# Patient Record
Sex: MALE | Race: BLACK OR AFRICAN AMERICAN | NOT HISPANIC OR LATINO | Employment: OTHER | URBAN - METROPOLITAN AREA
[De-identification: names, ages, dates, MRNs, and addresses within clinical notes are randomized per-mention and may not be internally consistent; named-entity substitution may affect disease eponyms.]

---

## 2017-10-03 ENCOUNTER — TRANSFERRED RECORDS (OUTPATIENT)
Dept: HEALTH INFORMATION MANAGEMENT | Facility: CLINIC | Age: 82
End: 2017-10-03

## 2017-10-04 ENCOUNTER — HOSPITAL ENCOUNTER (INPATIENT)
Facility: CLINIC | Age: 82
LOS: 3 days | Discharge: HOME | DRG: 291 | End: 2017-10-07
Attending: INTERNAL MEDICINE | Admitting: FAMILY MEDICINE

## 2017-10-04 DIAGNOSIS — I10 BENIGN ESSENTIAL HYPERTENSION: Primary | ICD-10-CM

## 2017-10-04 DIAGNOSIS — I50.30 (HFPEF) HEART FAILURE WITH PRESERVED EJECTION FRACTION (H): ICD-10-CM

## 2017-10-04 PROBLEM — N28.9 KIDNEY DISEASE: Status: ACTIVE | Noted: 2017-10-04

## 2017-10-04 PROBLEM — E78.5 HYPERLIPIDEMIA LDL GOAL <100: Status: ACTIVE | Noted: 2017-10-04

## 2017-10-04 PROBLEM — E78.5 HYPERLIPIDEMIA LDL GOAL <100: Status: RESOLVED | Noted: 2017-10-04 | Resolved: 2017-10-04

## 2017-10-04 PROBLEM — I50.9 ACUTE EXACERBATION OF CHF (CONGESTIVE HEART FAILURE) (H): Status: ACTIVE | Noted: 2017-10-04

## 2017-10-04 PROBLEM — E11.8 TYPE 2 DIABETES MELLITUS WITH COMPLICATION (H): Status: ACTIVE | Noted: 2017-10-04

## 2017-10-04 LAB
ALBUMIN SERPL-MCNC: 3.2 G/DL (ref 3.4–5)
ALP SERPL-CCNC: 87 U/L (ref 40–150)
ALT SERPL W P-5'-P-CCNC: 25 U/L (ref 0–70)
ANION GAP SERPL CALCULATED.3IONS-SCNC: 7 MMOL/L (ref 3–14)
AST SERPL W P-5'-P-CCNC: 8 U/L (ref 0–45)
BASOPHILS # BLD AUTO: 0 10E9/L (ref 0–0.2)
BASOPHILS NFR BLD AUTO: 0.5 %
BILIRUB SERPL-MCNC: 0.5 MG/DL (ref 0.2–1.3)
BUN SERPL-MCNC: 14 MG/DL (ref 7–30)
CALCIUM SERPL-MCNC: 10 MG/DL (ref 8.5–10.1)
CHLORIDE SERPL-SCNC: 108 MMOL/L (ref 94–109)
CO2 SERPL-SCNC: 26 MMOL/L (ref 20–32)
CREAT SERPL-MCNC: 1.41 MG/DL (ref 0.66–1.25)
DIFFERENTIAL METHOD BLD: NORMAL
EOSINOPHIL # BLD AUTO: 0.2 10E9/L (ref 0–0.7)
EOSINOPHIL NFR BLD AUTO: 2.8 %
ERYTHROCYTE [DISTWIDTH] IN BLOOD BY AUTOMATED COUNT: 14.1 % (ref 10–15)
GFR SERPL CREATININE-BSD FRML MDRD: 48 ML/MIN/1.7M2
GLUCOSE BLDC GLUCOMTR-MCNC: 122 MG/DL (ref 70–99)
GLUCOSE SERPL-MCNC: 131 MG/DL (ref 70–99)
HCT VFR BLD AUTO: 40 % (ref 40–53)
HGB BLD-MCNC: 13.4 G/DL (ref 13.3–17.7)
IMM GRANULOCYTES # BLD: 0 10E9/L (ref 0–0.4)
IMM GRANULOCYTES NFR BLD: 0.2 %
LYMPHOCYTES # BLD AUTO: 1 10E9/L (ref 0.8–5.3)
LYMPHOCYTES NFR BLD AUTO: 18.4 %
MCH RBC QN AUTO: 28.5 PG (ref 26.5–33)
MCHC RBC AUTO-ENTMCNC: 33.5 G/DL (ref 31.5–36.5)
MCV RBC AUTO: 85 FL (ref 78–100)
MONOCYTES # BLD AUTO: 0.3 10E9/L (ref 0–1.3)
MONOCYTES NFR BLD AUTO: 5.5 %
NEUTROPHILS # BLD AUTO: 4.1 10E9/L (ref 1.6–8.3)
NEUTROPHILS NFR BLD AUTO: 72.6 %
NRBC # BLD AUTO: 0 10*3/UL
NRBC BLD AUTO-RTO: 0 /100
NT-PROBNP SERPL-MCNC: 1766 PG/ML (ref 0–1800)
PLATELET # BLD AUTO: 199 10E9/L (ref 150–450)
POTASSIUM SERPL-SCNC: 3.6 MMOL/L (ref 3.4–5.3)
PROT SERPL-MCNC: 6.8 G/DL (ref 6.8–8.8)
RBC # BLD AUTO: 4.7 10E12/L (ref 4.4–5.9)
SODIUM SERPL-SCNC: 141 MMOL/L (ref 133–144)
TROPONIN I SERPL-MCNC: 0.01 UG/L (ref 0–0.04)
TROPONIN I SERPL-MCNC: <0.015 UG/L (ref 0–0.04)
WBC # BLD AUTO: 5.7 10E9/L (ref 4–11)

## 2017-10-04 PROCEDURE — 40000257 ZZH STATISTIC CONSULT NO CHARGE VASC ACCESS

## 2017-10-04 PROCEDURE — 85025 COMPLETE CBC W/AUTO DIFF WBC: CPT | Performed by: FAMILY MEDICINE

## 2017-10-04 PROCEDURE — 36415 COLL VENOUS BLD VENIPUNCTURE: CPT | Performed by: FAMILY MEDICINE

## 2017-10-04 PROCEDURE — 40000141 ZZH STATISTIC PERIPHERAL IV START W/O US GUIDANCE

## 2017-10-04 PROCEDURE — 84484 ASSAY OF TROPONIN QUANT: CPT | Performed by: FAMILY MEDICINE

## 2017-10-04 PROCEDURE — 80053 COMPREHEN METABOLIC PANEL: CPT | Performed by: FAMILY MEDICINE

## 2017-10-04 PROCEDURE — 83880 ASSAY OF NATRIURETIC PEPTIDE: CPT | Performed by: FAMILY MEDICINE

## 2017-10-04 PROCEDURE — 00000146 ZZHCL STATISTIC GLUCOSE BY METER IP

## 2017-10-04 PROCEDURE — 25000128 H RX IP 250 OP 636: Performed by: FAMILY MEDICINE

## 2017-10-04 PROCEDURE — 12000001 ZZH R&B MED SURG/OB UMMC

## 2017-10-04 RX ORDER — HYDRALAZINE HYDROCHLORIDE 20 MG/ML
5-10 INJECTION INTRAMUSCULAR; INTRAVENOUS EVERY 6 HOURS PRN
Status: DISCONTINUED | OUTPATIENT
Start: 2017-10-04 | End: 2017-10-05

## 2017-10-04 RX ORDER — ACETAMINOPHEN 325 MG/1
650 TABLET ORAL EVERY 4 HOURS PRN
Status: DISCONTINUED | OUTPATIENT
Start: 2017-10-04 | End: 2017-10-07 | Stop reason: HOSPADM

## 2017-10-04 RX ORDER — NICOTINE POLACRILEX 4 MG
15-30 LOZENGE BUCCAL
Status: DISCONTINUED | OUTPATIENT
Start: 2017-10-04 | End: 2017-10-07 | Stop reason: HOSPADM

## 2017-10-04 RX ORDER — ONDANSETRON 2 MG/ML
4 INJECTION INTRAMUSCULAR; INTRAVENOUS EVERY 6 HOURS PRN
Status: DISCONTINUED | OUTPATIENT
Start: 2017-10-04 | End: 2017-10-07 | Stop reason: HOSPADM

## 2017-10-04 RX ORDER — NALOXONE HYDROCHLORIDE 0.4 MG/ML
.1-.4 INJECTION, SOLUTION INTRAMUSCULAR; INTRAVENOUS; SUBCUTANEOUS
Status: DISCONTINUED | OUTPATIENT
Start: 2017-10-04 | End: 2017-10-07 | Stop reason: HOSPADM

## 2017-10-04 RX ORDER — FUROSEMIDE 10 MG/ML
20 INJECTION INTRAMUSCULAR; INTRAVENOUS ONCE
Status: COMPLETED | OUTPATIENT
Start: 2017-10-04 | End: 2017-10-04

## 2017-10-04 RX ORDER — ONDANSETRON 4 MG/1
4 TABLET, ORALLY DISINTEGRATING ORAL EVERY 6 HOURS PRN
Status: DISCONTINUED | OUTPATIENT
Start: 2017-10-04 | End: 2017-10-07 | Stop reason: HOSPADM

## 2017-10-04 RX ORDER — AMOXICILLIN 250 MG
1-2 CAPSULE ORAL 2 TIMES DAILY
Status: DISCONTINUED | OUTPATIENT
Start: 2017-10-04 | End: 2017-10-07 | Stop reason: HOSPADM

## 2017-10-04 RX ORDER — DEXTROSE MONOHYDRATE 25 G/50ML
25-50 INJECTION, SOLUTION INTRAVENOUS
Status: DISCONTINUED | OUTPATIENT
Start: 2017-10-04 | End: 2017-10-07 | Stop reason: HOSPADM

## 2017-10-04 RX ADMIN — HYDRALAZINE HYDROCHLORIDE 10 MG: 20 INJECTION INTRAMUSCULAR; INTRAVENOUS at 21:29

## 2017-10-04 RX ADMIN — FUROSEMIDE 20 MG: 10 INJECTION, SOLUTION INTRAVENOUS at 21:00

## 2017-10-04 ASSESSMENT — ENCOUNTER SYMPTOMS
PALPITATIONS: 1
AGITATION: 0
CONSTIPATION: 0
DIAPHORESIS: 0
DIZZINESS: 0
EYE PAIN: 0
ABDOMINAL PAIN: 0
UNEXPECTED WEIGHT CHANGE: 1
WHEEZING: 0
FEVER: 0
DYSURIA: 0
NAUSEA: 0
COLOR CHANGE: 0
NUMBNESS: 0
BACK PAIN: 0
SHORTNESS OF BREATH: 1
RHINORRHEA: 0
WEAKNESS: 0
CHILLS: 0
CHEST TIGHTNESS: 1
FREQUENCY: 0
COUGH: 0
DIARRHEA: 0
FATIGUE: 0
DIFFICULTY URINATING: 0
VOMITING: 0
CONFUSION: 0
LIGHT-HEADEDNESS: 0
WOUND: 0
APPETITE CHANGE: 0
HEADACHES: 0
SORE THROAT: 0

## 2017-10-04 NOTE — H&P
Attestation:  This patient has been seen and evaluated by me, Cale Craig on 10/4/2017.  I saw and discussed the case with the primary resident and the care team. I agree with the findings and plan in this note. I have reviewed today's vital signs, medications, laboratory results and imaging results.    Cale Craig MD  [facility]                                                                                                          [facility]  Inpatient History and Physical    Jami Morales MRN# [id number]   Age: 82 year old YOB: 1935     10/4/2017 5:26 PM    Home clinic: [facility]  Primary care provider:  Frankie Delaney MD          Chief Concern:   Shortness of breath and high blood pressure       History is obtained from the patient and his three sons at the bedside.          History of Present Illness (Resident / Clinician):   Jami Morales is a 82 year old Burmese male with a significant past medical history of hypertension and diabetes mellitus who presents with new onset dyspnea for 4-5 days and elevated BP. Patient has been on vacation in [place] from [facility] and has been having elevated BP for the past week as well as dyspnea and orthopnea for the past 4-5 days. He feels short of breath with exertion and when laying in bed at night which is partially relieved with pillows. He has also had bilateral lower extremity swelling for the past 4 months. In addition he has been feeling palpitations for the past 3 days but denies chest pain, syncope dizziness, or lightheadedness.    Last Wednesday patient presented to Dr. Delaney for abdominal pain and constipation and was found to have high blood pressure with no complaint of dyspnea. He was continued on lasartan 100 mg daily and added metoprolol 50 mg daily. Patient followed up with Dr. Delaney yesterday 10/3 and continued to have elevated /90 as well as episodes of hypoglycemia so his  glimeperide 4mg daily and linagliptin/metformin 2.5mg/500mg were discontinued.  Dr. Delaney discontinued metoprolol and started hydralazine 25 mg TID and Lasix 40 mg daily for his BP. In addition he checked BNP which was elevated at 443 and had Cr 1.5 so at that time called and advised patient to be admitted to the hospital for further work-up.     Of note patient is from \[facility]\"" and has been seen in [facility], [place], and [place] over the past 4 months with the main concern of lower extremity edema. He has been on various blood pressure and diabetes medications in the past few months but on admission was only taking losartan 100mg daily, hydralazine 25 mg TID, and lasix 40 mg daily.       Old records were obtained reviewed, and any additions to pertinent history are noted above.           Past Medical History:     Past Medical History:   Diagnosis Date     HTN (hypertension)      Type 2 diabetes mellitus (H)              Past Surgical History:     Past Surgical History:   Procedure Laterality Date     PROSTATE SURGERY  2008                Social History:   I have reviewed this patient's social history   no tobacco use, quit 30 years ago, prior 15-20 years 1PPD  no alcohol use  no illicit drug use           Family History:   I have reviewed this patient's family history   Mother- Hypertension, pacemaker  Son - Hypertension            Immunizations:   Immunization status is unknown          Allergies:   Review of patient's allergies indicates no known allergies.          Medications:     Losartan 100 mg daily   Hydralazine 25 mg three times a day   Lasix 40 mg daily   Metformin/Januvia 500/2.5 daily   Amaryl 4 mg two times a day          Review of Systems:   Review of Systems   Constitutional: Positive for unexpected weight change. Negative for appetite change, chills, diaphoresis, fatigue and fever.        Weight gain of 5 lbs in last week.    HENT: Negative for congestion, rhinorrhea and sore throat.    Eyes: Negative  for pain and visual disturbance.   Respiratory: Positive for chest tightness and shortness of breath. Negative for cough and wheezing.    Cardiovascular: Positive for palpitations and leg swelling. Negative for chest pain.   Gastrointestinal: Negative for abdominal pain, constipation, diarrhea, nausea and vomiting.   Genitourinary: Negative for difficulty urinating, dysuria, frequency and urgency.   Musculoskeletal: Negative for back pain and gait problem.   Skin: Negative for color change, pallor, rash and wound.   Neurological: Negative for dizziness, syncope, weakness, light-headedness, numbness and headaches.   Psychiatric/Behavioral: Negative for agitation and confusion.               Physical Exam:   Vitals were reviewed  Vital signs:  Temp: 97.9  F (36.6  C) Temp src: Oral BP: (!) 213/106 Pulse: 86   Resp: 16 SpO2: 96 % O2 Device: None (Room air)        There is no height or weight on file to calculate BMI.    Physical Exam   Constitutional: He is oriented to person, place, and time. He appears well-developed and well-nourished. No distress.   HENT:   Mouth/Throat: Oropharynx is clear and moist.   Eyes: Pupils are equal, round, and reactive to light.   Neck: No JVD present.   Cardiovascular: Normal rate and regular rhythm.  Exam reveals gallop.    No murmur heard.  Displaced PMI.    Pulmonary/Chest: Effort normal. No stridor. No respiratory distress. He has no wheezes.   Crackles bilateral lung bases.    Abdominal: Soft. Bowel sounds are normal. He exhibits no distension. There is no tenderness. There is no guarding.   Musculoskeletal: He exhibits edema.   2+ edema left lower extremity. 1+ edema right lower extremity.    Lymphadenopathy:     He has no cervical adenopathy.   Neurological: He is alert and oriented to person, place, and time.   Skin: Skin is warm and dry. He is not diaphoretic. No erythema.            Data:     Results for orders placed or performed during the hospital encounter of 10/04/17  (from the past 24 hour(s))   Glucose by meter   Result Value Ref Range    Glucose 122 (H) 70 - 99 mg/dL   Comprehensive metabolic panel   Result Value Ref Range    Sodium 141 133 - 144 mmol/L    Potassium 3.6 3.4 - 5.3 mmol/L    Chloride 108 94 - 109 mmol/L    Carbon Dioxide 26 20 - 32 mmol/L    Anion Gap 7 3 - 14 mmol/L    Glucose 131 (H) 70 - 99 mg/dL    Urea Nitrogen 14 7 - 30 mg/dL    Creatinine 1.41 (H) 0.66 - 1.25 mg/dL    GFR Estimate 48 (L) >60 mL/min/1.7m2    GFR Estimate If Black 59 (L) >60 mL/min/1.7m2    Calcium 10.0 8.5 - 10.1 mg/dL    Bilirubin Total 0.5 0.2 - 1.3 mg/dL    Albumin 3.2 (L) 3.4 - 5.0 g/dL    Protein Total 6.8 6.8 - 8.8 g/dL    Alkaline Phosphatase 87 40 - 150 U/L    ALT 25 0 - 70 U/L    AST 8 0 - 45 U/L   CBC with platelets differential   Result Value Ref Range    WBC 5.7 4.0 - 11.0 10e9/L    RBC Count 4.70 4.4 - 5.9 10e12/L    Hemoglobin 13.4 13.3 - 17.7 g/dL    Hematocrit 40.0 40.0 - 53.0 %    MCV 85 78 - 100 fl    MCH 28.5 26.5 - 33.0 pg    MCHC 33.5 31.5 - 36.5 g/dL    RDW 14.1 10.0 - 15.0 %    Platelet Count 199 150 - 450 10e9/L    Diff Method Automated Method     % Neutrophils 72.6 %    % Lymphocytes 18.4 %    % Monocytes 5.5 %    % Eosinophils 2.8 %    % Basophils 0.5 %    % Immature Granulocytes 0.2 %    Nucleated RBCs 0 0 /100    Absolute Neutrophil 4.1 1.6 - 8.3 10e9/L    Absolute Lymphocytes 1.0 0.8 - 5.3 10e9/L    Absolute Monocytes 0.3 0.0 - 1.3 10e9/L    Absolute Eosinophils 0.2 0.0 - 0.7 10e9/L    Absolute Basophils 0.0 0.0 - 0.2 10e9/L    Abs Immature Granulocytes 0.0 0 - 0.4 10e9/L    Absolute Nucleated RBC 0.0    Troponin I   Result Value Ref Range    Troponin I ES 0.015 0.000 - 0.045 ug/L      EKG results: outside clinic   Performed yesterday  I have reviewed this patient's EKG with the following interpretation:    NSR with occasional PVC with 1st degree AV block with nonspecific T wave abnormalities         All imaging studies reviewed by me.  Chest x-ray: outside  clinic   ?bilateral basilar infiltrates, effusions, pneumothoraces, cardiomegaly or masses             Assessment and Plan:   Assessment:   Jami Morales is a 82 year old [place] male with a significant past medical history of hypertension and diabetes mellitus who presents with new onset dyspnea for 4-5 days and elevated BP.  Patient Active Problem List   Diagnosis     Acute exacerbation of CHF (congestive heart failure) (H)     Benign essential hypertension     Type 2 diabetes mellitus with complication (H)     Kidney disease - Unclear CKD vs FARHAN         Plan:    ##Acute onset shortness of breath likely due to acute CHF exacerbation   Patient has been experiencing new onset dyspnea and orthopnea for the past 4-5 days and leg swelling for the past 3-4 months. BNP on 10/3 was elevated to 443 with Creatinine of 1.5. Chest xray yesterday showed bilateral pleural effusions with borderline cardiomegaly. On cardiac exam he has a S3 gallop and displaced PMI and bilateral pitting edema.Patients constellation of symptoms is likely due to acute CHF exacerbation in setting of chronic hypertension   - CBC, CMP, BNP, lipid panel ordered   - Continuous  Telemetry monitoring   - Serial troponins and EKG   - Echo ordered   - Lasix IV 20 mg, will evaluate diuerses in am    - Cardiology consult  - Nutrition consult for low sodium diet  - Low sodium diet <2gm  - Strict I/Os  - Daily weights   - Fluid restriction of 1500ml    ## Hypertension urgency vs emergency   /106 on admission . Most recent creatinine was 1.5 up from 1.37 from a week ago. Unclear if this is acute vs chronic kidney injury as no prior records but patient and his sons report his baseline creatinine has been 1.3-1.4.  Will evaluate repeat labs for evidence of other end organ damage and possible hypertensive emergency.   - Hold PTA losaratan 100 mg daily in setting of likely acute CHF   - Hold PTA hydralazine 25 mg TID  - Hydralazine IV 5-10mg q6H PRN for  SBP >180 or DBP >110.    ##Diabetes Mellitus type II  DM II for 15-20 years, patient reports hgbA1c of less than 5 but will evaluate further.   - HgbA1c ordered  - Low dose sliding scale insulin   - Continue to hold PTA glimeperide 4mg daily and linagliptin/metformin 2.5mg/500mg    ## Acute vs Chronic kidney Injury   Unclear of Cr 1.5 is acutely or chroncically elevated. Patient reports chronic elevation of 1.4 but will evaluate further.   -UA and mircoalbumin ordered     Daily cares -   F: 1500ml fluid restriction   E:labs pending   N: 2gm sodium  Lines:PIV  Activity:As tolerated   CODE:Full code  Prophylaxis: ambulation   PCP communication:  - No Ref-Primary, Physician  Dispo: Expected discharge date likely 2-3 days pending clinical improvement.      Discussed with and examined by faculty.  Laly Montenegro, MS4      The medical student acted as scribe and the encounter documented above was completely performed by myself and the documentation reflects the work I have performed today. MD Kristina Alvarez MD MPH  PGY3- [facility], Family Medicine  Pager- [phone]

## 2017-10-04 NOTE — PROGRESS NOTES
[facility]  Transfer Triage Note    Date of call: 10/4/2017  Time of call: afternoon    Reason for Transfer: Admit from PCP clinic  Diagnosis: Heart failure    Outside Records: in careeverywhere    Stability of Patient: Patient coming in from home and deemed stable by PCP    Expected Time of Arrival for Transfer: < 24 hours    Recommendations for Management and Stabilization: None    Additional Comments:     83 y/o Greek male with h/o HTN, DM and new diagnosis of heart failure.  He was seen in PCP clinic one week ago.  Found to have elevated blood pressure of 180/95.  Started on metoprolol and lisinopril.  He was previously on nifedipine which was changed to losartan in [place].  Returned to clinic and still hypertensive to 190/95.  Cr is up to 1.5.  He was then started on hydralazine and metoprolol stopped.  Found to also have lower extremity edema, shortness of breath, cxr with pulmonary edema.  EKG with left anterior fasicular block.  PCP requesting admission given failed outpatient hypertension management as well as volume overload requiring diuretics monitoring of creatinine.      Dolores Kenny DO

## 2017-10-04 NOTE — IP AVS SNAPSHOT
[address] [address]    Phone:  [phone]                                       After Visit Summary   10/4/2017    Jami Morales    MRN: [id number]           After Visit Summary Signature Page     I have received my discharge instructions, and my questions have been answered. I have discussed any challenges I see with this plan with the nurse or doctor.    ..........................................................................................................................................  Patient/Patient Representative Signature      ..........................................................................................................................................  Patient Representative Print Name and Relationship to Patient    ..................................................               ................................................  Date                                            Time    ..........................................................................................................................................  Reviewed by Signature/Title    ...................................................              ..............................................  Date                                                            Time

## 2017-10-04 NOTE — IP AVS SNAPSHOT
MRN:[id number]                      After Visit Summary   10/4/2017    Jami Morales    MRN: [id number]           Thank you!     Thank you for choosing [facility] for your care. Our goal is always to provide you with excellent care. Hearing back from our patients is one way we can continue to improve our services. Please take a few minutes to complete the written survey that you may receive in the mail after you visit with us. Thank you!        Patient Information     Date Of Birth          1935        Designated Caregiver       Most Recent Value    Caregiver    Will someone help with your care after discharge? yes    Name of designated caregiver luciano    Phone number of caregiver     Caregiver address [address] [place]      About your hospital stay     You were admitted on:  October 4, 2017 You last received care in the:  [address] [facility]    You were discharged on:  October 7, 2017        Reason for your hospital stay       You were admitted for treatment of heart failure and high blood pressure.                  Who to Call     For medical emergencies, please call 911.  For non-urgent questions about your medical care, please call your primary care provider or clinic, None          Attending Provider     Provider Specialty    Dolores Kenny,  Internal Medicine    Cale Craig MD Family Practice    [facility]       Primary Care Provider Fax #    Physician No Ref-Primary [phone]      After Care Instructions     Activity       Your activity upon discharge: activity as tolerated            Diet       Follow this diet upon discharge: Orders Placed This Encounter      Fluid restriction 1500 ML FLUID      2 Gram Sodium Diet                  Follow-up Appointments     Adult [facility] Follow-up and recommended labs and tests       Follow up with primary care provider,Dr. Ortiz, within 7 days for hospital follow- up.  The following  labs/tests are recommended: BMP.      Appointments on [place] and/or [facility] (with [facility] or [facility] provider or service). Call [phone] if you haven't heard regarding these appointments within 7 days of discharge.                  Further instructions from your care team       Please call for primary care provider follow up when you return to [place]    Please follow up with a doctor within the next 3 days.     Please get a repeat BMP (kidney function test with blood salt levels) at your next doctor appointment.     General Recommendations To Control Heart Failure When You Get Home       Heart Failure Instructions for Patients and Families: Please read and check off each of these important instructions as you do them when you get home.          Weight and Symptoms       ___ Put a scale in your bathroom.    ___ Post a weight chart or calendar next to your scale.    ___ Weigh yourself everyday as soon as you get up in the morning (before breakfast).  You should only be wearing your pajamas.  Write your weight on the chart/calendar.  ___ Bring your weight chart/calendar with you to all appointments.  ___ Call your doctor or nurse practitioner if you gain 2 pounds (in 1 day) or 5 pounds in (1 week) from your goal  good  weight.  Your good weight is also called your  dry  weight.  Your doctor or nurse will tell you what your good weight should be.    ___ Call your doctor or nurse practitioner if you have shortness of breath that gets worse over time, leg swelling or fatigue.       Medications and Diet       ___ Make sure to take your medication as prescribed.    ___ Bring a current list of your medication and all of your medicine bottles with you to all appointments.    ___ Limit fluids if you still have swelling or shortness of breath, or if your doctor tells you to do so.    ___ Eat less than 2000 mg of sodium (salt) every day. Read food labels, and do not add salt to meals. Remember, if you eat less salt  you retain less fluid.  ___ Follow a heart healthy diet that is low in saturated fat.        Activity and Suggested Lifestyle Changes      ___ Stay active. Talk to your doctor about an exercise program that is safe for your heart.  ___ Stop smoking. Reduce alcohol use.      ___ Lose weight if you are overweight. Extra weight puts a lot of stress on the heart.                 Control for Leg Swelling     ___ Keep your legs elevated (raised) as needed for swelling. If swelling is uncomfortable or elevation doesn t help, ask your doctor about using ACE wraps or support stockings.        What is the [facility].R[facility] Clinic?  Cardiomyopathy  Optimization  Rehabilitation  Education    The [facility].R[facility] Clinic is a heart failure specialty clinic within [facility].  It is an outpatient disease management program that is based on a phase-by-phase approach, which is tailored to each patient s individual needs.  The cardiologist, nurse practitioner, physician assistant and nurses provide an ongoing outpatient care and treatment plan that guides heart failure and cardiomyopathy patients from evaluation and education to stabilization. This team works with your current primary care doctor and cardiologist to help you:    - Avoid hospitalizations  - Slow the progression of the disease  - Improve length and quality of life  - Know who and when to call if heart failure symptoms appear  - Receive easy access to quality health care and advice  - Better understand your condition and treatment  - Decrease the tremendous cost burden of heart failure care  - Detect future heart problems before they become life threatening                                 Follow-up Appointments     ___ An appointment with the [facility] Clinic may already have been made for you (see section   Your next appointments already scheduled ).  If you have a question about your appointment, would like to speak with a C.O.R.E. nurse, or would like to become a C.O.R.E.  "Clinic patient, please call one of the following locations:     - [facility] [place] [facility] ([place]):                                             [phone]  - [facility] ([place]):                                            [phone]  - [facility] ([place]):                 [phone]      ___ Your C.O.R.E. Clinic Team will continue to educate you on your heart failure and may adjust medications based on your vital signs, lab work, and how you are feeling.  Therefore, it is very important to bring the following to all C.O.R.E. appointments:    - An accurate list of your medications  - Your medicine bottles  - Your weight chart/calendar                   Pending Results     No orders found from 10/2/2017 to 10/5/2017.            Statement of Approval     Ordered          10/07/17 1347  I have reviewed and agree with all the recommendations and orders detailed in this document.  EFFECTIVE NOW     Approved and electronically signed by:  Kristina Cook MD             Admission Information     Date & Time Provider Department Dept. Phone    10/4/2017 Wilfred Kohli MD [address] [facility] [phone]      Your Vitals Were     Blood Pressure Pulse Temperature Respirations Height Weight    163/81 (BP Location: Right arm) 91 97.2  F (36.2  C) (Oral) 16 1.905 m (6' 3\") 92.5 kg (204 lb)    Pulse Oximetry BMI (Body Mass Index)                94% 25.5 kg/m2          MyChart Information     [facility] lets you send messages to your doctor, view your test results, renew your prescriptions, schedule appointments and more. To sign up, go to [email or web address] . Click on \"Log in\" on the left side of the screen, which will take you to the Welcome page. Then click on \"Sign up Now\" on the right side of the page.     You will be asked to enter the access code listed below, as well as some personal information. Please follow the directions to create your username and " password.     Your access code is: [email or web address]  Expires: 2018  1:47 PM     Your access code will  in 90 days. If you need help or a new code, please call your [facility] clinic or [phone].        Care EveryWhere ID     This is your Care EveryWhere ID. This could be used by other organizations to access your [facility] medical records  [id number]        Equal Access to Services     MAY CARRERA : Hadii aad ku hadasho Soomaali, waaxda luqadaha, qaybta kaalmada adeegyada, waxay idiin hayaan sergo rennerreginamayank caruso . So [facility] [phone].    ATENCIÓN: Si habla español, tiene a santos disposición servicios gratuitos de asistencia lingüística. Llame al [phone].    We comply with applicable federal civil rights laws and [place] laws. We do not discriminate on the basis of race, color, national origin, age, disability, sex, sexual orientation, or gender identity.               Review of your medicines      START taking        Dose / Directions    amLODIPine 10 MG tablet   Commonly known as:  NORVASC        Dose:  10 mg   Take 1 tablet (10 mg) by mouth daily   Quantity:  30 tablet   Refills:  0       furosemide 20 MG tablet   Commonly known as:  LASIX   Used for:  (HFpEF) heart failure with preserved ejection fraction (H)        Dose:  40 mg   Take 2 tablets (40 mg) by mouth daily   Quantity:  60 tablet   Refills:  0       losartan 100 MG tablet   Commonly known as:  COZAAR        Dose:  100 mg   Take 1 tablet (100 mg) by mouth daily   Quantity:  30 tablet   Refills:  0            Where to get your medicines      These medications were sent to [facility] [place], [place] - [address]  [address], [facility] [address]     Phone:  [phone]     amLODIPine 10 MG tablet    furosemide 20 MG tablet    losartan 100 MG tablet                Protect others around you: Learn how to safely use, store and throw away your medicines at www.disposemymeds.org.             Medication  List: This is a list of all your medications and when to take them. Check marks below indicate your daily home schedule. Keep this list as a reference.      Medications           Morning Afternoon Evening Bedtime As Needed    amLODIPine 10 MG tablet   Commonly known as:  NORVASC   Take 1 tablet (10 mg) by mouth daily   Last time this was given:  10 mg on 10/7/2017  9:19 AM                                furosemide 20 MG tablet   Commonly known as:  LASIX   Take 2 tablets (40 mg) by mouth daily                                losartan 100 MG tablet   Commonly known as:  COZAAR   Take 1 tablet (100 mg) by mouth daily   Last time this was given:  100 mg on 10/7/2017  9:19 AM

## 2017-10-05 ENCOUNTER — APPOINTMENT (OUTPATIENT)
Dept: CARDIOLOGY | Facility: CLINIC | Age: 82
DRG: 291 | End: 2017-10-05
Attending: FAMILY MEDICINE

## 2017-10-05 LAB
ALBUMIN UR-MCNC: 100 MG/DL
ANION GAP SERPL CALCULATED.3IONS-SCNC: 6 MMOL/L (ref 3–14)
APPEARANCE UR: CLEAR
BASOPHILS # BLD AUTO: 0 10E9/L (ref 0–0.2)
BASOPHILS NFR BLD AUTO: 0.2 %
BILIRUB UR QL STRIP: NEGATIVE
BUN SERPL-MCNC: 16 MG/DL (ref 7–30)
CALCIUM SERPL-MCNC: 10.2 MG/DL (ref 8.5–10.1)
CHLORIDE SERPL-SCNC: 108 MMOL/L (ref 94–109)
CHOLEST SERPL-MCNC: 156 MG/DL
CO2 SERPL-SCNC: 25 MMOL/L (ref 20–32)
COLOR UR AUTO: ABNORMAL
CREAT SERPL-MCNC: 1.38 MG/DL (ref 0.66–1.25)
CREAT UR-MCNC: 92 MG/DL
DIFFERENTIAL METHOD BLD: NORMAL
EOSINOPHIL # BLD AUTO: 0.2 10E9/L (ref 0–0.7)
EOSINOPHIL NFR BLD AUTO: 4 %
ERYTHROCYTE [DISTWIDTH] IN BLOOD BY AUTOMATED COUNT: 14.2 % (ref 10–15)
GFR SERPL CREATININE-BSD FRML MDRD: 50 ML/MIN/1.7M2
GLUCOSE BLDC GLUCOMTR-MCNC: 112 MG/DL (ref 70–99)
GLUCOSE BLDC GLUCOMTR-MCNC: 114 MG/DL (ref 70–99)
GLUCOSE BLDC GLUCOMTR-MCNC: 141 MG/DL (ref 70–99)
GLUCOSE BLDC GLUCOMTR-MCNC: 141 MG/DL (ref 70–99)
GLUCOSE BLDC GLUCOMTR-MCNC: 146 MG/DL (ref 70–99)
GLUCOSE BLDC GLUCOMTR-MCNC: 150 MG/DL (ref 70–99)
GLUCOSE BLDC GLUCOMTR-MCNC: 193 MG/DL (ref 70–99)
GLUCOSE BLDC GLUCOMTR-MCNC: 206 MG/DL (ref 70–99)
GLUCOSE SERPL-MCNC: 155 MG/DL (ref 70–99)
GLUCOSE UR STRIP-MCNC: NEGATIVE MG/DL
HBA1C MFR BLD: 5.9 % (ref 4.3–6)
HCT VFR BLD AUTO: 40 % (ref 40–53)
HDLC SERPL-MCNC: 54 MG/DL
HGB BLD-MCNC: 13.9 G/DL (ref 13.3–17.7)
HGB UR QL STRIP: NEGATIVE
IMM GRANULOCYTES # BLD: 0 10E9/L (ref 0–0.4)
IMM GRANULOCYTES NFR BLD: 0 %
KETONES UR STRIP-MCNC: NEGATIVE MG/DL
LDLC SERPL CALC-MCNC: 87 MG/DL
LEUKOCYTE ESTERASE UR QL STRIP: NEGATIVE
LYMPHOCYTES # BLD AUTO: 0.9 10E9/L (ref 0.8–5.3)
LYMPHOCYTES NFR BLD AUTO: 15.8 %
MAGNESIUM SERPL-MCNC: 1.8 MG/DL (ref 1.6–2.3)
MCH RBC QN AUTO: 28.6 PG (ref 26.5–33)
MCHC RBC AUTO-ENTMCNC: 34.8 G/DL (ref 31.5–36.5)
MCV RBC AUTO: 82 FL (ref 78–100)
MICROALBUMIN UR-MCNC: 1440 MG/L
MICROALBUMIN/CREAT UR: 1565.22 MG/G CR (ref 0–17)
MONOCYTES # BLD AUTO: 0.4 10E9/L (ref 0–1.3)
MONOCYTES NFR BLD AUTO: 6.7 %
NEUTROPHILS # BLD AUTO: 4.1 10E9/L (ref 1.6–8.3)
NEUTROPHILS NFR BLD AUTO: 73.3 %
NITRATE UR QL: NEGATIVE
NONHDLC SERPL-MCNC: 102 MG/DL
NRBC # BLD AUTO: 0 10*3/UL
NRBC BLD AUTO-RTO: 0 /100
PH UR STRIP: 7.5 PH (ref 5–7)
PLATELET # BLD AUTO: 194 10E9/L (ref 150–450)
POTASSIUM SERPL-SCNC: 3.3 MMOL/L (ref 3.4–5.3)
POTASSIUM SERPL-SCNC: 3.6 MMOL/L (ref 3.4–5.3)
RBC # BLD AUTO: 4.86 10E12/L (ref 4.4–5.9)
RBC #/AREA URNS AUTO: 1 /HPF (ref 0–2)
SODIUM SERPL-SCNC: 139 MMOL/L (ref 133–144)
SOURCE: ABNORMAL
SP GR UR STRIP: 1.01 (ref 1–1.03)
SPERM #/AREA URNS HPF: PRESENT /HPF
TRIGL SERPL-MCNC: 72 MG/DL
UROBILINOGEN UR STRIP-MCNC: NORMAL MG/DL (ref 0–2)
WBC # BLD AUTO: 5.6 10E9/L (ref 4–11)
WBC #/AREA URNS AUTO: 0 /HPF (ref 0–2)

## 2017-10-05 PROCEDURE — 25000132 ZZH RX MED GY IP 250 OP 250 PS 637: Performed by: FAMILY MEDICINE

## 2017-10-05 PROCEDURE — 25000131 ZZH RX MED GY IP 250 OP 636 PS 637: Performed by: FAMILY MEDICINE

## 2017-10-05 PROCEDURE — 36415 COLL VENOUS BLD VENIPUNCTURE: CPT | Performed by: FAMILY MEDICINE

## 2017-10-05 PROCEDURE — 93306 TTE W/DOPPLER COMPLETE: CPT

## 2017-10-05 PROCEDURE — 85025 COMPLETE CBC W/AUTO DIFF WBC: CPT | Performed by: FAMILY MEDICINE

## 2017-10-05 PROCEDURE — 25000128 H RX IP 250 OP 636: Performed by: FAMILY MEDICINE

## 2017-10-05 PROCEDURE — 83735 ASSAY OF MAGNESIUM: CPT | Performed by: FAMILY MEDICINE

## 2017-10-05 PROCEDURE — 99221 1ST HOSP IP/OBS SF/LOW 40: CPT | Mod: 25 | Performed by: INTERNAL MEDICINE

## 2017-10-05 PROCEDURE — 12000001 ZZH R&B MED SURG/OB UMMC

## 2017-10-05 PROCEDURE — 80048 BASIC METABOLIC PNL TOTAL CA: CPT | Performed by: FAMILY MEDICINE

## 2017-10-05 PROCEDURE — 00000146 ZZHCL STATISTIC GLUCOSE BY METER IP

## 2017-10-05 PROCEDURE — 84132 ASSAY OF SERUM POTASSIUM: CPT | Performed by: FAMILY MEDICINE

## 2017-10-05 PROCEDURE — 80061 LIPID PANEL: CPT | Performed by: FAMILY MEDICINE

## 2017-10-05 PROCEDURE — 81001 URINALYSIS AUTO W/SCOPE: CPT | Performed by: FAMILY MEDICINE

## 2017-10-05 PROCEDURE — 83036 HEMOGLOBIN GLYCOSYLATED A1C: CPT | Performed by: FAMILY MEDICINE

## 2017-10-05 PROCEDURE — 82043 UR ALBUMIN QUANTITATIVE: CPT | Performed by: FAMILY MEDICINE

## 2017-10-05 PROCEDURE — 93306 TTE W/DOPPLER COMPLETE: CPT | Mod: 26 | Performed by: INTERNAL MEDICINE

## 2017-10-05 RX ORDER — FUROSEMIDE 10 MG/ML
20 INJECTION INTRAMUSCULAR; INTRAVENOUS ONCE
Status: COMPLETED | OUTPATIENT
Start: 2017-10-05 | End: 2017-10-05

## 2017-10-05 RX ORDER — POTASSIUM CHLORIDE 14.9 MG/ML
20 INJECTION INTRAVENOUS
Status: DISCONTINUED | OUTPATIENT
Start: 2017-10-05 | End: 2017-10-07 | Stop reason: HOSPADM

## 2017-10-05 RX ORDER — POTASSIUM CL/LIDO/0.9 % NACL 10MEQ/0.1L
10 INTRAVENOUS SOLUTION, PIGGYBACK (ML) INTRAVENOUS
Status: DISCONTINUED | OUTPATIENT
Start: 2017-10-05 | End: 2017-10-07 | Stop reason: HOSPADM

## 2017-10-05 RX ORDER — HYDRALAZINE HYDROCHLORIDE 20 MG/ML
10 INJECTION INTRAMUSCULAR; INTRAVENOUS EVERY 6 HOURS PRN
Status: DISCONTINUED | OUTPATIENT
Start: 2017-10-05 | End: 2017-10-07 | Stop reason: HOSPADM

## 2017-10-05 RX ORDER — POTASSIUM CHLORIDE 1.5 G/1.58G
20-40 POWDER, FOR SOLUTION ORAL
Status: DISCONTINUED | OUTPATIENT
Start: 2017-10-05 | End: 2017-10-07 | Stop reason: HOSPADM

## 2017-10-05 RX ORDER — AMLODIPINE BESYLATE 5 MG/1
5 TABLET ORAL DAILY
Status: DISCONTINUED | OUTPATIENT
Start: 2017-10-06 | End: 2017-10-07

## 2017-10-05 RX ORDER — POTASSIUM CHLORIDE 750 MG/1
20-40 TABLET, EXTENDED RELEASE ORAL
Status: DISCONTINUED | OUTPATIENT
Start: 2017-10-05 | End: 2017-10-07 | Stop reason: HOSPADM

## 2017-10-05 RX ORDER — AMLODIPINE BESYLATE 2.5 MG/1
5 TABLET ORAL ONCE
Status: COMPLETED | OUTPATIENT
Start: 2017-10-05 | End: 2017-10-05

## 2017-10-05 RX ORDER — POTASSIUM CHLORIDE 7.45 MG/ML
10 INJECTION INTRAVENOUS
Status: DISCONTINUED | OUTPATIENT
Start: 2017-10-05 | End: 2017-10-07 | Stop reason: HOSPADM

## 2017-10-05 RX ORDER — LOSARTAN POTASSIUM 50 MG/1
100 TABLET ORAL DAILY
Status: DISCONTINUED | OUTPATIENT
Start: 2017-10-05 | End: 2017-10-07 | Stop reason: HOSPADM

## 2017-10-05 RX ADMIN — INSULIN ASPART 1 UNITS: 100 INJECTION, SOLUTION INTRAVENOUS; SUBCUTANEOUS at 09:39

## 2017-10-05 RX ADMIN — HYDRALAZINE HYDROCHLORIDE 10 MG: 20 INJECTION INTRAMUSCULAR; INTRAVENOUS at 04:10

## 2017-10-05 RX ADMIN — INSULIN ASPART 1 UNITS: 100 INJECTION, SOLUTION INTRAVENOUS; SUBCUTANEOUS at 14:50

## 2017-10-05 RX ADMIN — LOSARTAN POTASSIUM 100 MG: 50 TABLET, FILM COATED ORAL at 10:08

## 2017-10-05 RX ADMIN — INSULIN ASPART 1 UNITS: 100 INJECTION, SOLUTION INTRAVENOUS; SUBCUTANEOUS at 17:37

## 2017-10-05 RX ADMIN — AMLODIPINE BESYLATE 5 MG: 2.5 TABLET ORAL at 18:08

## 2017-10-05 RX ADMIN — POTASSIUM CHLORIDE 20 MEQ: 750 TABLET, EXTENDED RELEASE ORAL at 14:51

## 2017-10-05 RX ADMIN — SENNOSIDES AND DOCUSATE SODIUM 2 TABLET: 8.6; 5 TABLET ORAL at 20:14

## 2017-10-05 RX ADMIN — HYDRALAZINE HYDROCHLORIDE 10 MG: 20 INJECTION INTRAMUSCULAR; INTRAVENOUS at 20:14

## 2017-10-05 RX ADMIN — FUROSEMIDE 20 MG: 10 INJECTION, SOLUTION INTRAVENOUS at 11:30

## 2017-10-05 RX ADMIN — POTASSIUM CHLORIDE 40 MEQ: 750 TABLET, EXTENDED RELEASE ORAL at 10:09

## 2017-10-05 NOTE — DISCHARGE SUMMARY
[facility]  Discharge Summary    Jami Morales MRN# [id number]   Age: 82 year old YOB: 1935     Date of Admission:  10/4/2017  Date of Discharge:  10/7/2017  Admitting Physician:  Cale Craig MD  Discharge Physician:  Wilfred Kohli MD  Contact: [phone]  Discharging Service:  [facility]     Primary Provider:   No Ref-Primary, Physician         Discharge Disposition:   Discharged to home    None       Condition on Discharge:   Discharge condition: Stable   Code status on discharge: Full Code          Admission Diagnoses:   hypertension  heart failure  Acute exacerbation of CHF (congestive heart failure) (H)          Principle Discharge Problem:   Acute exacerbation of CHF (congestive heart failure) (H)         Additional Discharge Problems:     Patient Active Problem List   Diagnosis     Acute exacerbation of CHF (congestive heart failure) (H)     Benign essential hypertension     Type 2 diabetes mellitus with complication (H)     Kidney disease - Unclear CKD vs FARHAN            Medications Prior to Admission:     No current facility-administered medications on file prior to encounter.   No current outpatient prescriptions on file prior to encounter.         Discharge Medications:        Review of your medicines      START taking       Dose / Directions    amLODIPine 10 MG tablet   Commonly known as:  NORVASC        Dose:  10 mg   Take 1 tablet (10 mg) by mouth daily   Quantity:  30 tablet   Refills:  0       furosemide 20 MG tablet   Commonly known as:  LASIX   Used for:  (HFpEF) heart failure with preserved ejection fraction (H)        Dose:  40 mg   Take 2 tablets (40 mg) by mouth daily   Quantity:  60 tablet   Refills:  0       losartan 100 MG tablet   Commonly known as:  COZAAR        Dose:  100 mg   Take 1 tablet (100 mg) by mouth daily   Quantity:  30 tablet   Refills:  0            Where to get your  medicines      These medications were sent to [facility] - [place], [place] - [address]  [address] [facility] [address]     Phone:  [phone]      amLODIPine 10 MG tablet     furosemide 20 MG tablet     losartan 100 MG tablet                  Discharge Instructions and Follow-Up:   Discharge diet: 2g salt   Discharge activity: Activity as tolerated   Discharge follow-up: Follow up with primary care provider in 3-4 days and Cardiology in 3 months.    Lines and drains: None    Wound care: None          Brief Admission History and Evaluation:   Jami Morales is a 82 year old male with past medical history of hypertension and diabetes mellitus type II who was admitted for dyspnea and orthopnea for 4-5 and days, increased leg swelling, and elevated BP 200s/100s. Patient was being seen by Dr. Delaney for BP and diabetes management and was found to have elevated , Cr. 1.5, and chest xray demonstrating small bilateral pleural effusions and heart size on the upper limits of normal. He was advised to be admitted to the hospital for further work-up. On admission he was found to have bilateral lung base crackles, S3 gallop, displaced PMI, and 2+ pitting edema. Serial troponins, BMP, BNP, urine albumin were ordered. Patient was given Lasix IV 20 mg and BP of 213/106 was treated acutely with IV Hydralazine 10mg.            Hospital Course/Discharge Plan by Problem:     ##Acute Exacerbation of newly diagnosed HFpEF  Patient had been experiencing new onset dyspnea and orthopnea for 4-5 days and leg swelling for 3-4 months. BNP on 10/3 was elevated to 443, then 1776 on 10/4 with Creatinine of 1.38. On cardiac exam he had a S3 gallop and displaced PMI and bilateral pitting edema. Serial troponins negative with telemetry monitoring. Chest xray showed bilateral pleural effusions with borderline cardiomegaly. Constellation of symptoms believed to be be due to acute CHF exacerbation in  setting of chronic hypertension. Patient was treated with Lasix IV, fluid restriction 1500mL, 2 gm sodium diet, and daily weights. Echo demonstrated hyperdynamic left ventricle with EF of >65% and concentric left ventricular hypertrophy. Cardiology consulted and diagnosed patient with HFpEF. Patient has shown clinical improvement in his symptoms, no orthopnea, mild pedal edema, and S3 gallop has resolved. During hospital stay patient received education on heart failure and nutrition counseling. Patient is from [facility] and travels extensively between [facility], [facility], [place], [place], and [place] visiting family. Spoke extensively with patient's sons about the need for close follow-up on discharge to monitor both HFpEF and possible medication side effects. Patient discharged on Lasix 40 mg PO daily, Losartan 100 mg PO qAM, and Amlodipine 10 mg PO qPM. Instructed to follow up with PCP in 3-4 days for repeat BMP, BP monitoring, and any needed medication adjustement.           ## Hypertensive Urgency   /106 on admission. Creatinine is elevated but stable from prior records at OSHs and throughout hospital stay between 1.3-1.4. During hospital stay patient was managed with home losartan 100 mg qam, Hydralazine IV 10 mg PRN for SBP >160, and Lasix IV 20 mg. Blood pressure was frequently in 190s/90s and treated with hydralazine. Amlodipine 5 mg was added to regimen then increased to Amlodipine 10 mg. Renal artery ultrasound ordered to rule out secondary cause of hypertension and came back negative for renal artery stenosis. On discharge patients BP was more controlled in 150s-160s/90s-80s and was not requiring Hydralazine IV, Patient was discharged on medications as stated in problem one with close follow-up for hypertension management.         ## Hypokalemia   Likely secondary to diuretics. Potassium 3.3 at lowest during hospital stay and put on replacement protocol and potassium increased and was 3.4 on  discharge. Instructed to follow-up for repeat testing of potassium and possible need for oral potassium supplementation.      ##Diabetes Mellitus type II  DM II for 15-20 years and well controlled with HgbA1c 5.9. Instructed to discontinue all diabetes medications as blood sugars are well controlled. Continue to follow with a primary care provider for continued management.      ## CKD stage 2   Prior lab results provided by family show Cr of 1.4 to 1.5 over last 3-4 months and with eGRF of 50 and macroalbuminuria patient has CKD stage 2. On discharge Cr of 1.46. Continue Losartan 100 mg daily.           Final Day of Progress before Discharge:         Interval History: No acute events overnight. Recorded BPof 155/83 and 162/82 and did not require any Hydralazine IV for BP control. Denies dyspnea, orthopnea, and leg swelling. Has walked around the unit without any difficulty.         Physical Exam:  Vitals were reviewed  Temp: 97.2  F (36.2  C) Temp src: Oral BP: 163/81   Heart Rate: 88 Resp: 16 SpO2: 94 % O2 Device: None (Room air)      Constitutional:   awake, alert, cooperative, no apparent distress, and appears stated age     Lungs:   No increased work of breathing, good air exchange, clear to auscultation bilaterally, no crackles or wheezing     Cardiovascular:   Displaced apical impulse, regular rate and rhythm, normal S1 and S2, no S3 or S4, and no murmur noted.      Abdomen:   No scars, normal bowel sounds, soft, non-distended, non-tender, no masses palpated, no hepatosplenomegally     Musculoskeletal:   No edema of extremities. There is no redness, warmth, or swelling of the joints.  Full range of motion noted. Tone is normal.     Skin:   normal skin color, texture, turgor          Data:  All laboratory data reviewed  Echocardiology:   Performed during this admission        Results: Interpretation Summary  Hyperdynamic left ventricle. EF is >65%. Mild concentric wall thickeningconsistent with left ventricular  hypertrophy is present. Normal left ventricular filling for age. No regional wall motion abnormalities are seen.The left ventricular mass index is 120 g/m2 (mildly increased.) The left ventricular geometry is c/w concentric hypertrophy measured by relative wall thickness.The right ventricle is normal size and function.The inferior vena cava was normal in size with preserved respiratoryvariability.  No pericardial effusion is present.  Previous study not available for comparison.           All imaging studies reviewed by me.         Pending Results:   None      It was a pleasure to participate in the care of Jami Morales.  If you have questions about his and her care, please do not hesitate to contact the [facility] team via the hospital [facility] on which we are stationed.      Laly Montenegro, MS4  [facility] Family Medicine Residency  [facility], [address] - [phone]    The medical student acted as scribe and the encounter documented above was completely performed by myself and the documentation reflects the work I have performed today. MD Kristina Alvarez MD MPH  PGY3- [facility], Family Medicine  Pager- [phone]

## 2017-10-05 NOTE — PROGRESS NOTES
"Patient up and ambulated in otto. Patient was tachycardic with ambulating. Patients B/P was 208/112 immediately after ambulation. His B/P was 198/106 20 minutes after ambulation.   BP (!) 198/106  Pulse 98  Temp 98.6  F (37  C) (Oral)  Resp 16  Ht 1.905 m (6' 3\")  Wt 92.8 kg (204 lb 8 oz)  SpO2 97%  BMI 25.56 kg/m2  "

## 2017-10-05 NOTE — PROGRESS NOTES
Pt admitted from clinic for treatment of HTN and SOB.  Pt received 10mg hydralazine on evenings; recheck of /105.  Pt was given a second dose of 10mg hydralazine with BP down to 166/84.  Tele shows NSR, rate 80's, BBB and occ PVC.  Pt denies any chest pain.  Pt received 20mg Lasix IV on evening shift; uop 1880cc/8hrs.  Pt states his breathing is better and he feels the swelling in his legs has decreased.  Oxygen saturations remain >93% on RA.  Pt remains on a 2Gm Na, 1500ml fluid restriction.  BS stable at 114.  Pt is [place] speaking; son at bedside and has been interpreting for pt.

## 2017-10-05 NOTE — PROGRESS NOTES
[facility] - Inpatient daily progress note    Date of admission: 10/4/2017  Date of service: 10/5/2017.           Assessment and Plan:   Assessment:   82 year old male with the following problem list hypertension and diabetes mellitus type II, admitted for new onset dyspnea of 4-5 days and elevated blood pressure with likely acute exacerbation of CHF.   Patient Active Problem List   Diagnosis     Acute exacerbation of CHF (congestive heart failure) (H)     Benign essential hypertension     Type 2 diabetes mellitus with complication (H)     Kidney disease - Unclear CKD vs FARHAN      Plan:   ##Acute onset shortness of breath likely due to acute CHF exacerbation   Patient has been experiencing new onset dyspnea and orthopnea for the past 4-5 days and leg swelling for the past 3-4 months. BNP on 10/3 was elevated to 443, now 1776 with Creatinine of 1.38. Serial troponins negative. Chest xray yesterday showed bilateral pleural effusions with borderline cardiomegaly. On cardiac exam he has a S3 gallop and displaced PMI and bilateral pitting edema.Patients constellation of symptoms is likely due to acute CHF exacerbation in setting of chronic hypertension. Serial troponins have been negative and patient has shown clinical improvement in his symptoms with reduced orthopnea and swelling. Echo demonstrated hyperdynamic left ventricle with EF of >65% and concentric left ventricular hypertrophy and cardiology has been consulted for further evaluation and recommendation.    - Continuous  Telemetry monitoring   - Lasix IV 20 mg one time dose today   - Cardiology consult  - Nutrition consult for low sodium diet  - Strict I/Os  - Daily weights - 92.76 kg down from 99.8 kg on 10/3 in outpatient clinic.   - Fluid restriction of 1500ml     ## Hypertensive Urgency   /106 on admission. Creatinine is elevated but stable from prior records at OSHs.   - Continue PTA losaratan 100 mg  daily   - Hold PTA hydralazine 25 mg PO TID  - Hydralazine IV 5-10mg q6H PRN for SBP >180 or DBP >110.     ## Hypokalemia   Likely secondary to diuretics.   - on replacement protocol and likely will need supplementation on discharge     ##Diabetes Mellitus type II  DM II for 15-20 years and well controlled with HgbA1c 5.9.   - HgbA1c 5.9  - Low dose sliding scale insulin     ## CKD stage 2   Prior lab results provided by family show Cr of 1.4 to 1.5 over last 3-4 months and with eGRF of 60 and macroalbuminuria patient has CKD stage 2.   - Continue PTA Losartan 100 mg daily   - Daily BMPs     Daily cares -   F: 1500ml fluid restriction   E: potassium 3.3, replaced   N: 2gm sodium  Lines:PIV  Activity:As tolerated   CODE:Full code  Prophylaxis: ambulation   PCP communication:  - No Ref-Primary, Physician  Dispo: Expected discharge date likely 1-2 days pending clinical improvement.            Interval History:   Jami Morales 81 y/o male with HTN, DM II, admitted for acute exacerbation of CHF. Overnight had elevated BP to 200/105, Hydralazine IV 10 mg was given with no improvement. A second dose of Hydralazine IV 10 mg ws given and BP came down to 166/84. Patient reports improvement in his lower extremity edema, no orthopnea overnight, and no dyspnea or chest tightness.             Review of Systems:   CONSTITUTIONAL: no fatigue, no unexpected change in weight  SKIN: no worrisome rashes or lesions  EYES: no acute vision problems or changes  ENT: no ear problems, no mouth problems, no throat problems  RESP: no significant cough, no shortness of breath  CV: no chest pain, no palpitations, no new or worsening peripheral edema  GI: no nausea, no vomiting, no constipation, no diarrhea  : no frequency, no dysuria, no hematuria  NEURO: no weakness, no dizziness, no headaches  ENDOCRINE: no temperature intolerance, no skin/hair changes  PSYCHIATRIC: NEGATIVE for changes in mood or trouble with sleep       Physical Exam  (Resident / Clinician):   Vitals were reviewed  Temp: 98  F (36.7  C) Temp src: Oral BP: 168/78 Pulse: 98 Heart Rate: 88 Resp: 16 SpO2: 95 % O2 Device: None (Room air)      Constitutional:   awake, alert, cooperative, no apparent distress, and appears stated age     Lungs:   No increased work of breathing, good air exchange, clear to auscultation bilaterally, no crackles or wheezing     Cardiovascular:   Displaced apical impulse, regular rate and rhythm, normal S1 and S2 with S3 gallop.      Abdomen:   No scars, normal bowel sounds, soft, non-distended, non-tender, no masses palpated, no hepatosplenomegally     Musculoskeletal:   2+ edema in feet. There is no redness, warmth, or swelling of the joints.  Full range of motion noted.  Tone is normal.     Intake/Output Summary (Last 24 hours) at 10/05/17 1230  Last data filed at 10/05/17 1010   Gross per 24 hour   Intake              210 ml   Output             3880 ml   Net            -3670 ml           Data:   ROUTINE LABS (Last four results)  CMP  Recent Labs  Lab 10/05/17  0703 10/04/17  1803    141   POTASSIUM 3.3* 3.6   CHLORIDE 108 108   CO2 25 26   ANIONGAP 6 7   * 131*   BUN 16 14   CR 1.38* 1.41*   GFRESTIMATED 50* 48*   GFRESTBLACK 60* 59*   NESLY 10.2* 10.0   MAG 1.8  --    PROTTOTAL  --  6.8   ALBUMIN  --  3.2*   BILITOTAL  --  0.5   ALKPHOS  --  87   AST  --  8   ALT  --  25     CBC  Recent Labs  Lab 10/05/17  0703 10/04/17  1803   WBC 5.6 5.7   RBC 4.86 4.70   HGB 13.9 13.4   HCT 40.0 40.0   MCV 82 85   MCH 28.6 28.5   MCHC 34.8 33.5   RDW 14.2 14.1    199     INRNo lab results found in last 7 days.  CRPNo lab results found in last 7 days.    Blood culture:  Invalid input(s): BC   Urine culture:  No results for input(s): URC in the last 168 hours.  All cultures:  No results for input(s): CULT in the last 168 hours.      Medications:     Current Facility-Administered Medications   Medication     potassium chloride SA (K-DUR/KLOR-CON M) DAVE  tablet 20-40 mEq     potassium chloride (KLOR-CON) Packet 20-40 mEq     potassium chloride 10 mEq in 100 mL intermittent infusion     potassium chloride 10 mEq in 100 mL intermittent infusion with 10 mg lidocaine     potassium chloride 20 mEq in 100 mL NON-STANDARD CONC intermittent infusion     losartan (COZAAR) tablet 100 mg     naloxone (NARCAN) injection 0.1-0.4 mg     acetaminophen (TYLENOL) tablet 650 mg     senna-docusate (SENOKOT-S;PERICOLACE) 8.6-50 MG per tablet 1-2 tablet     ondansetron (ZOFRAN-ODT) ODT tab 4 mg    Or     ondansetron (ZOFRAN) injection 4 mg     hydrALAZINE (APRESOLINE) injection 5-10 mg     glucose 40 % gel 15-30 g    Or     dextrose 50 % injection 25-50 mL    Or     glucagon injection 1 mg     insulin aspart (NovoLOG) inj (RAPID ACTING)     insulin aspart (NovoLOG) inj (RAPID ACTING)       Caring Physician: Laly Montenegro  [facility]  Pager Contact: see Physician sticky note      The medical student acted as scribe and the encounter documented above was completely performed by myself and the documentation reflects the work I have performed today. Simeon Howard MD

## 2017-10-05 NOTE — CONSULTS
Cardiology Inpatient Consultation  October 5, 2017    Reason for Consult:  A cardiology consult was requested by Simeon Howard from the Family Medicine service to provide clinical guidance regarding a hyperdynamic left ventricle seen on ECHO w/concern for heart failure.    HPI:   Jami Morales is a 82 year old male w/a PMHx HTN, DMII, who presents with new onset dyspnea for 4-5 days and elevated BP. Patient has been on vacation in [place] from [facility] and has been having elevated BP, dyspnea, and orthopnea for about 1 week prior to presentation. He feels short of breath with exertion and when laying in bed at night which is partially relieved with pillows. This are new symptoms for him as of the past week. Normally he is able to walk without difficulty. He has also had bilateral lower extremity swelling for the past week. Patients family denies any cardiac history. Patient has had BPs in the 160's/80 average for 15-20 years. His home/chronic medication history is not clear. In addition he has been feeling palpitations for the past 3 days but denies chest pain, syncope dizziness, or lightheadedness.    He presented to [facility] clinic with Dr. Delaney on 9/27/17 for abdominal discomfort. Patient is visiting son from [facility]. Per Dr. Delaney's note, patient has been off and on his diabetes and hypertension medications for the week prior to presentation to clinic. Patient is unable to give a clear report of his medications, though it does appear he is on Cozaar. He was started on metoprolol for hypertension and told to f/u in one week. On 10/3 he represented to clinic with uncontrolled HTN, and signs and symptoms of heart failure including SOB, lower extremity edema, 5lb weight gain over the previous week, rales, +1 pitting edema BLE, and pleural effusions on CXR, EKG w/PVC, left anterior fascicular block & some TWI. Patient was instructed to present to ED for BP management, diuresis, and MI r/o. Patient  initially refused and was given losartan, hydralazine, lasix. Creatinine, BNP where checked. He then presented to [facility] ED and was admitted for treatment.     Of note patient is from [facility] and has been seen in [facility], [place], and [place] over the past 2 months with the main concern of lower extremity edema. On admission was taking losartan 100mg daily, hydralazine 25 mg TID, and lasix 40 mg daily.     At the time of interview, the patient denies chest pain, dyspnea at rest or with exertion, orthopnea, PND, palpitations, lightheadedness, or syncope.     Review of Systems:    Complete review of systems was performed and negative except per HPI.    PMH:  Past Medical History:   Diagnosis Date     HTN (hypertension)      Type 2 diabetes mellitus (H)      Active Problems:  Patient Active Problem List    Diagnosis Date Noted     Acute exacerbation of CHF (congestive heart failure) (H) 10/04/2017     Priority: Medium     Benign essential hypertension 10/04/2017     Priority: Medium     Type 2 diabetes mellitus with complication (H) 10/04/2017     Priority: Medium     Kidney disease - Unclear CKD vs FARHAN 10/04/2017     Priority: Medium     Social History:  Social History   Substance Use Topics     Smoking status: Former Smoker     Smokeless tobacco: Never Used     Alcohol use No     Family History:  Family History   Problem Relation Age of Onset     Hypertension Mother      HEART DISEASE Mother      Pacemaker     Hypertension Son        Medications:    losartan  100 mg Oral Daily     senna-docusate  1-2 tablet Oral BID     insulin aspart  1-3 Units Subcutaneous TID AC     insulin aspart  1-3 Units Subcutaneous At Bedtime       Physical Exam:  Temp:  [97.7  F (36.5  C)-98.6  F (37  C)] 98  F (36.7  C)  Pulse:  [76-98] 98  Heart Rate:  [86-88] 88  Resp:  [16] 16  BP: (166-216)/() 168/78  SpO2:  [94 %-97 %] 95 %    Intake/Output Summary (Last 24 hours) at 10/05/17 1424  Last data filed at 10/05/17 1010   Gross per 24  hour   Intake              210 ml   Output             3880 ml   Net            -3670 ml       Intake/Output Summary (Last 24 hours) at 10/05/17 1806  Last data filed at 10/05/17 1300   Gross per 24 hour   Intake              210 ml   Output             4280 ml   Net            -4070 ml       GEN: A&Ox3, in NAD, pleasant, cooperative   HEENT: AT/NC, PERRLA, MMM  NECK: JVD to 9 cm sitting 90*  CV:irregular w/S3, prominent PMI   LUNGS: CTAB  EXT: Normal muscle bulk and tone, BLE +1 pitting edema to knees, unequal leg swelling with left greater than right   SKIN: w/d/i      Diagnostics:  All labs and imaging were reviewed, of note:  Results for BRITTNEY JONES (MRN [id number]) as of 10/5/2017 14:42    10/5/2017 07:03   Sodium  139   Potassium  3.3 (L)   Chloride  108   Carbon Dioxide  25   Urea Nitrogen  16   Creatinine  1.38 (H)       10/5/2017 07:03   WBC  5.6   Hemoglobin  13.9   Hematocrit  40.0   Platelet Count  194       10/4/2017 18:03   N-Terminal Pro BNP  1766     10/3/2017  B Type Natr. Peptide 443 (H)       Lab Results   Component Value Date    TROPI <0.015 10/04/2017    TROPI 0.015 10/04/2017       ECHO 9/4/17:  Interpretation Summary  Hyperdynamic left ventricle. EF is >65%. Mild concentric wall thickening consistent with left ventricular hypertrophy is present. Normal left ventricular filling for age. No regional wall motion abnormalities are seen. The left ventricular mass index is 120 g/m2 (mildly increased.) The left ventricular geometry is c/w concentric hypertrophy measured by relative wallthickness.  The right ventricle is normal size and function.  The inferior vena cava was normal in size with preserved respiratory variability.  No pericardial effusion is present.  Previous study not available for comparison.      10/3/17 2-View CXR  FINDINGS: There is some patchy infiltrate in the posterior medial aspect of the right lower lobe. Subsegmental atelectasis in the left lung base. Small pleural  effusions bilaterally with partial loculation of the right-sided effusion. Heart size at the upper limits of normal with mild pulmonary venous hypertension.    Assessment and Recommendation:  Jami Morales is a 82 year old male w/a PMHx HTN, DMII, who presents with presumably new onset HFpEF, however he is visiting from [facility] and his care is quite fragmented between [facility] and [facility] .     1. HFpEF  Patient has been experiencing new onset dyspnea and orthopnea, BLE edema for the past 3-4 months. BNP on 10/3 to 443 with Creatinine of 1.5, now BNP of 1776 & Cr 1.38. EF > 65% w/LVH and no WMA. Troponin trend negative. Chest xray showed bilateral pleural effusions with borderline cardiomegaly. On cardiac exam he has a S3 gallop and displaced PMI and bilateral pitting edema. Symptoms are likely due to acute CHF exacerbation in setting of chronic hypertension.   - Given likely poor follow-up would limit medications, as unlikely to be able to monitor BP, lytes, etc.   - Diuresis: IV Lasix 20mg   - 2L fluid restriction   - 2mg Na restriction   - Strict I/O  - daily weight   - he should have a PCP or cardiologist as an outpatient     2. Accelerated hypertension  3. Essential HTN   4. Left ventricular hypertrophy, concentric  The patient has evidence of concentric LVH consistent with history of HTN that is poorly managed. Concern with BPs, given recorded during this admission in 200's. RN suggests some lower BPs not recorded. Will need accurate BP records as this is likely the underlying cause for heart failure, and a stroke risk in this 81 y/o male.   - As above, he needs a PCP or a cardiologist for outpatient management   - Amlodipine 5mg PO opd  - Continue losartan 100mg daily  - Hydralazine 10mg PO q6 hrs PRN for SBP >160  - Stress that he should have regular follow up with one provider or one group regarding his treatment     I have discussed the above with Dr. Francois.    Thank you for consulting the  cardiovascular services at the [facility]. Please do not hesitate to call us with any questions.       Jasper Smith IV, MD, MSc  Internal Medicine Resident, PGY1  [phone]    ATTENDING ATTESTATION:  Patient has been seen and evaluated by me on October 5, 2017. I have reviewed the medications, laboratory, imaging, and other relevant results.  I agree with the above note which I have edited, as necessary.  I have discussed my assessment and plan with the house staff.    Sabina Francois MD, MS  Staff Cardiologist, [facility]  Pager: [phone]

## 2017-10-06 ENCOUNTER — APPOINTMENT (OUTPATIENT)
Dept: ULTRASOUND IMAGING | Facility: CLINIC | Age: 82
DRG: 291 | End: 2017-10-06
Attending: INTERNAL MEDICINE

## 2017-10-06 LAB
ANION GAP SERPL CALCULATED.3IONS-SCNC: 7 MMOL/L (ref 3–14)
ANION GAP SERPL CALCULATED.3IONS-SCNC: 7 MMOL/L (ref 3–14)
BUN SERPL-MCNC: 19 MG/DL (ref 7–30)
BUN SERPL-MCNC: 21 MG/DL (ref 7–30)
CALCIUM SERPL-MCNC: 10.4 MG/DL (ref 8.5–10.1)
CALCIUM SERPL-MCNC: 10.8 MG/DL (ref 8.5–10.1)
CHLORIDE SERPL-SCNC: 104 MMOL/L (ref 94–109)
CHLORIDE SERPL-SCNC: 106 MMOL/L (ref 94–109)
CO2 SERPL-SCNC: 25 MMOL/L (ref 20–32)
CO2 SERPL-SCNC: 27 MMOL/L (ref 20–32)
CREAT SERPL-MCNC: 1.36 MG/DL (ref 0.66–1.25)
CREAT SERPL-MCNC: 1.46 MG/DL (ref 0.66–1.25)
GFR SERPL CREATININE-BSD FRML MDRD: 46 ML/MIN/1.7M2
GFR SERPL CREATININE-BSD FRML MDRD: 50 ML/MIN/1.7M2
GLUCOSE BLDC GLUCOMTR-MCNC: 119 MG/DL (ref 70–99)
GLUCOSE BLDC GLUCOMTR-MCNC: 121 MG/DL (ref 70–99)
GLUCOSE BLDC GLUCOMTR-MCNC: 130 MG/DL (ref 70–99)
GLUCOSE BLDC GLUCOMTR-MCNC: 137 MG/DL (ref 70–99)
GLUCOSE BLDC GLUCOMTR-MCNC: 154 MG/DL (ref 70–99)
GLUCOSE SERPL-MCNC: 116 MG/DL (ref 70–99)
GLUCOSE SERPL-MCNC: 130 MG/DL (ref 70–99)
MAGNESIUM SERPL-MCNC: 2 MG/DL (ref 1.6–2.3)
POTASSIUM SERPL-SCNC: 3.4 MMOL/L (ref 3.4–5.3)
POTASSIUM SERPL-SCNC: 3.6 MMOL/L (ref 3.4–5.3)
SODIUM SERPL-SCNC: 138 MMOL/L (ref 133–144)
SODIUM SERPL-SCNC: 138 MMOL/L (ref 133–144)

## 2017-10-06 PROCEDURE — 25000128 H RX IP 250 OP 636: Performed by: FAMILY MEDICINE

## 2017-10-06 PROCEDURE — 93975 VASCULAR STUDY: CPT | Mod: TC

## 2017-10-06 PROCEDURE — 83735 ASSAY OF MAGNESIUM: CPT | Performed by: FAMILY MEDICINE

## 2017-10-06 PROCEDURE — 00000146 ZZHCL STATISTIC GLUCOSE BY METER IP

## 2017-10-06 PROCEDURE — 99232 SBSQ HOSP IP/OBS MODERATE 35: CPT | Mod: GC | Performed by: INTERNAL MEDICINE

## 2017-10-06 PROCEDURE — 25000132 ZZH RX MED GY IP 250 OP 250 PS 637: Performed by: FAMILY MEDICINE

## 2017-10-06 PROCEDURE — 80048 BASIC METABOLIC PNL TOTAL CA: CPT | Performed by: FAMILY MEDICINE

## 2017-10-06 PROCEDURE — 36415 COLL VENOUS BLD VENIPUNCTURE: CPT | Performed by: FAMILY MEDICINE

## 2017-10-06 PROCEDURE — 12000001 ZZH R&B MED SURG/OB UMMC

## 2017-10-06 RX ORDER — AMLODIPINE BESYLATE 2.5 MG/1
5 TABLET ORAL ONCE
Status: COMPLETED | OUTPATIENT
Start: 2017-10-06 | End: 2017-10-06

## 2017-10-06 RX ORDER — FUROSEMIDE 10 MG/ML
40 INJECTION INTRAMUSCULAR; INTRAVENOUS ONCE
Status: COMPLETED | OUTPATIENT
Start: 2017-10-06 | End: 2017-10-06

## 2017-10-06 RX ADMIN — SENNOSIDES AND DOCUSATE SODIUM 1 TABLET: 8.6; 5 TABLET ORAL at 08:37

## 2017-10-06 RX ADMIN — LOSARTAN POTASSIUM 100 MG: 50 TABLET, FILM COATED ORAL at 08:37

## 2017-10-06 RX ADMIN — HYDRALAZINE HYDROCHLORIDE 10 MG: 20 INJECTION INTRAMUSCULAR; INTRAVENOUS at 16:44

## 2017-10-06 RX ADMIN — AMLODIPINE BESYLATE 5 MG: 2.5 TABLET ORAL at 08:36

## 2017-10-06 RX ADMIN — FUROSEMIDE 40 MG: 10 INJECTION, SOLUTION INTRAVENOUS at 10:03

## 2017-10-06 RX ADMIN — AMLODIPINE BESYLATE 5 MG: 2.5 TABLET ORAL at 17:56

## 2017-10-06 RX ADMIN — HYDRALAZINE HYDROCHLORIDE 10 MG: 20 INJECTION INTRAMUSCULAR; INTRAVENOUS at 06:16

## 2017-10-06 NOTE — CONSULTS
"Cardiology Progress Note  October 6, 2017    Subjective:  Patient reports he is doing well overnight. He denies any new symptoms. BPs remained elevated overnight 180-190/100's range. Received two doses of PRN hydralazine. BPs remain elevated.     He denies chest pain, palpitations or shortness of breath. Remainder of 4 point ROS reviewed and negative except as above.    Objective:   Vitals: BP (!) 190/114 (BP Location: Right arm)  Pulse 95  Temp 98.3  F (36.8  C) (Oral)  Resp 16  Ht 1.905 m (6' 3\")  Wt 92.8 kg (204 lb 8 oz)  SpO2 100%  BMI 25.56 kg/m2     Intake/Output Summary (Last 24 hours) at 10/06/17 0826  Last data filed at 10/06/17 0531   Gross per 24 hour   Intake              990 ml   Output             2140 ml   Net            -1150 ml     Vitals:    10/05/17 0612   Weight: 92.8 kg (204 lb 8 oz)     Physical exam:  GEN: A&Ox3, in NAD, pleasant, cooperative   HEENT: AT/NC, PERRLA, MMM  NECK: JVD to 9 cm sitting 90*  CV:irregular w/S3, prominent PMI   LUNGS: CTAB  EXT: Normal muscle bulk and tone, BLE +1 pitting edema to knees, unequal leg swelling with left greater than right   SKIN: w/d/i    Labs:  Lab Results   Component Value Date    TROPI <0.015 10/04/2017    TROPI 0.015 10/04/2017       Diagnostic studies:   ECHO 9/4/17:  Interpretation Summary  Hyperdynamic left ventricle. EF is >65%. Mild concentric wall thickening consistent with left ventricular hypertrophy is present. Normal left ventricular filling for age. No regional wall motion abnormalities are seen. The left ventricular mass index is 120 g/m2 (mildly increased.) The left ventricular geometry is c/w concentric hypertrophy measured by relative wallthickness.  The right ventricle is normal size and function.  The inferior vena cava was normal in size with preserved respiratory variability.  No pericardial effusion is present.  Previous study not available for comparison.  Aortic valve sclerosis         10/3/17 2-View CXR  FINDINGS: There " is some patchy infiltrate in the posterior medial aspect of the right lower lobe. Subsegmental atelectasis in the left lung base. Small pleural effusions bilaterally with partial loculation of the right-sided effusion. Heart size at the upper limits of normal with mild pulmonary venous hypertension.    Meds per EPIC EMR:    losartan  100 mg Oral Daily     amLODIPine  5 mg Oral Daily     senna-docusate  1-2 tablet Oral BID     insulin aspart  1-3 Units Subcutaneous TID AC     insulin aspart  1-3 Units Subcutaneous At Bedtime       Assessment and Recommendation:   10/3/17 2-View CXR  FINDINGS: There is some patchy infiltrate in the posterior medial aspect of the right lower lobe. Subsegmental atelectasis in the left lung base. Small pleural effusions bilaterally with partial loculation of the right-sided effusion. Heart size at the upper limits of normal with mild pulmonary venous hypertension.     Assessment and Recommendation:  Jami Morales is a 82 year old male w/a PMHx HTN, DMII, who presents with presumably new onset HFpEF. He is visiting from [facility] and his care is quite fragmented between [facility] and [facility] .      1. HFpEF  Patient has been experiencing new onset dyspnea and orthopnea, BLE edema for the past 3-4 months. BNP on 10/3 to 443 with Creatinine of 1.5, now BNP of 1776 & Cr 1.38. EF > 65% w/LVH and no WMA. Troponin trend negative. Chest xray showed bilateral pleural effusions with borderline cardiomegaly. On cardiac exam he has a S3 gallop and displaced PMI and bilateral pitting edema. Symptoms are likely due to acute CHF exacerbation in setting of chronic hypertension.   - Given likely poor follow-up would limit medications, as unlikely to be able to monitor BP, lytes, etc.   - Lasix 20mg IV qDay  - 2L fluid restriction   - 2mg Na restriction   - Strict I/O  - daily weight   - he should have a PCP or cardiologist as an outpatient      2. Accelerated HTN   The patient has evidence of concentric  LVH consistent with history of HTN that is poorly managed. Concern with BPs, given recorded during this admission in 200's. Despite amlodipine and PRN hydralazine, BPs remain near 180-190/100 overnight.  - As above, he needs a PCP or a cardiologist for outpatient management   - Stress that he should have regular follow up with one provider/group regarding his treatment   - Amlodipine 5mg tab PO qHS, can consider increasing to 10mg on 10/7 if remains hypertensive/requiring hydralazine PRNs  - Losartan 100mg tab qAM  - Hydralazine 10mg PO q6 hrs PRN for SBP >160  - OK for d/c if BP <170, ideally <160       I have discussed the above with Dr. Francois.     Thank you for consulting the cardiovascular services at the [facility]. Please do not hesitate to call us with any questions.         Jasper Smith IV, MD, MSc  Internal Medicine Resident, PGY1  [phone]    ATTENDING ATTESTATION:  Patient has been seen and evaluated by me on October 6, 2017. I have reviewed the medications, laboratory, imaging, and other relevant results.  I agree with the above note which I have edited, as necessary.  I have discussed my assessment and plan with the house staff.    Sabina Francois MD, MS  Staff Cardiologist, [facility]  Pager: [phone]

## 2017-10-06 NOTE — PROGRESS NOTES
CLINICAL NUTRITION SERVICES    Reason for Assessment:  Low-sodium (2 g/day) nutrition education    Diet History:  Pt's family  reports no history of receiving low-sodium nutrition education in the past.    Nutrition Diagnosis:  Food- and nutrition-related knowledge deficit r/t no previous knowledge of low-sodium diet AEB pt report of no previous formal low-sodium nutrition education.    Nutrition Prescription/Recs:  Continue low-sodium diet.      Interventions:  Nutrition Education  1.  Provided verbal instruction on a heart-healthy diet with emphasis on low-sodium meal planning.  2.  Provided the following handouts:  How to Read Nutrition Labels, Tips for a Low-Sodium Diet, Seasoning Your Foods Without Adding Salt, and Managing Fluid Restriction.      Goals:    Pt/Pt family will verbalize at least five high sodium foods and the importance of avoiding added salt to foods for cooking or seasoning foods.     Follow-up:   Patient to ask any further nutrition-related questions before discharge.  In addition, pt may request outpatient RD appointment.    Juani Young MS/ALVA/JIM/CNSC  6D RD Pager: [phone]

## 2017-10-06 NOTE — PROGRESS NOTES
0459-1827: Pt remains hypertensive, prn dose of hydralazine and extra dose of norvasc given per MD orders. Pt also received 40mg IV lasix today. Good urine output-see I&Os. Renal u/s with duplex completed today-pt was NPO for this. Family is at bedside and has been interpreting (signed waiver in chart). Pt up ad joe with steady gait. Denies pain.Will continue to monitor.

## 2017-10-06 NOTE — PROGRESS NOTES
"Admission of acute CHF exacerbation. Pt A&OX4. Denies SOB or chest pain. PRN Hydralazine administered for elevated BP. BG wnl limits. On 2 gm Na diet/fluid restriction. Tolerating well. Voiding adequate amount.Sinus rhythm with Bundle branch block and PVCs on tele,hr ~80.Pt's nephew at bedside reporting need for nutrition consult for pt education. Pt reluctant to eat or drink adequate amount due to CHF diagnosis. Team to be updated. Will continue plan of care.  BP (!) 190/114 (BP Location: Right arm)  Pulse 95  Temp 98.3  F (36.8  C) (Oral)  Resp 16  Ht 1.905 m (6' 3\")  Wt 92.8 kg (204 lb 8 oz)  SpO2 100%  BMI 25.56 kg/m2    "

## 2017-10-06 NOTE — PROGRESS NOTES
[facility] - Inpatient daily progress note    Date of admission: 10/4/2017  Date of service: 10/6/2017.           Assessment and Plan:   Assessment:   82 year old male with the following problem list hypertension and diabetes mellitus type II, admitted for new onset dyspnea of 4-5 days and elevated blood pressure with acute exacerbation of CHF.   Patient Active Problem List   Diagnosis     Acute exacerbation of CHF (congestive heart failure) (H)     Benign essential hypertension     Type 2 diabetes mellitus with complication (H)     Kidney disease - Unclear CKD vs FARHAN      Plan:   ##Acute exacerbation of newly diagnosed HFpEF   Patient had been experiencing new onset dyspnea and orthopnea for 4-5 days and leg swelling for 3-4 months. BNP on 10/3 was elevated to 443, then 1776 with Creatinine of 1.38. On cardiac exam he had a S3 gallop and displaced PMI and bilateral pitting edema. Serial troponins negative. Chest xray showed bilateral pleural effusions with borderline cardiomegaly. Constellation of symptoms believed to be be due to acute CHF exacerbation in setting of chronic hypertension. Echo demonstrated hyperdynamic left ventricle with EF of >65% and concentric left ventricular hypertrophy. Cardiology consulted and diagnosed patient with HFpEF. Patient has shown clinical improvement in his symptoms, no orthopnea and mild pedal edema, persistent S3.   - Continuous  Telemetry monitoring   - Lasix IV 40 mg today   - Nutrition consult for low sodium diet  - Heart failure education consult.    - Strict I/Os - net deficit 1610 ml past 24 hours  - Daily weights - 92.53 kg today vs 92.5 kg yesterday, 99.8 kg on 10/3 in outpatient clinic.   - Fluid restriction of 1500ml     ## Hypertensive Urgency   /106 on admission. Creatinine is elevated but stable around 1.4 from prior records at OSHs. Yesterday and overnight BP has been in 190s/90s and was started on  amlodipine 5 mg and given PRN Hydralazine 10 mg. This morning /84. Goal BP <150/90.   - Continue PTA losaratan 100 mg daily   - If BP remains elevated this evening give additional 5 mg amlodipine and then up to 10 mg daily.   - Hydralazine IV 10mg q6H PRN for SBP >160 or DBP >100.  - Renal artery ultrasound ordered for evaluation of secondary cause of HTN  - Continue close monitoring of BP.      ## Hypokalemia   Likely secondary to diuretics.   - Potassium 3.4 today, 3.3 yesterday.   - Repeat BMP this evening   - on replacement protocol and likely will need supplementation on discharge     ##Diabetes Mellitus type II  DM II for 15-20 years and well controlled with HgbA1c 5.9.   - HgbA1c 5.9  - Low dose sliding scale insulin     ## CKD stage 2   Prior lab results provided by family show Cr of 1.4 to 1.5 over last 3-4 months and with eGRF of 60 and macroalbuminuria patient has CKD stage 2.   - Continue PTA Losartan 100 mg daily   - Daily BMPs     Daily cares -   F: 1500ml fluid restriction   E: potassium 3.4, replaced   N: 2gm sodium  Lines:PIV  Activity:As tolerated   CODE:Full code  Prophylaxis: ambulation   PCP communication:  - No Ref-Primary, Physician  Dispo: Expected discharge date likely 1-2 days pending clinical improvement.            Interval History:   Jami Morales is an 81 y/o male with HTN, DM II, admitted for acute exacerbation of CHF. Overnight had elevated BP to 190s/90s, patient was started on amlodipine 5 mg and given Hydralazine IV 10 mg . BP this morning is 173/84. Patient reports mild swelling of his feet, no orthopnea overnight, and no dyspnea or chest tightness. Patient has walked around unit without shortness of breath.             Review of Systems:   CONSTITUTIONAL: no fatigue, no unexpected change in weight  SKIN: no worrisome rashes or lesions  EYES: no acute vision problems or changes  ENT: no ear problems, no mouth problems, no throat problems  RESP: no significant cough, no  shortness of breath  CV: no chest pain, no palpitations, no new or worsening peripheral edema  GI: no nausea, no vomiting, no constipation, no diarrhea  : no frequency, no dysuria, no hematuria  NEURO: no weakness, no dizziness, no headaches  ENDOCRINE: no temperature intolerance, no skin/hair changes  PSYCHIATRIC: NEGATIVE for changes in mood or trouble with sleep       Physical Exam (Resident / Clinician):   Vitals were reviewed  Temp: 98.3  F (36.8  C) Temp src: Oral BP: 173/84 (team rounding, aware of this BP) Pulse: 95 Heart Rate: 86 Resp: 16 SpO2: 100 % O2 Device: None (Room air)      Constitutional:   awake, alert, cooperative, no apparent distress, and appears stated age     Lungs:   No increased work of breathing, good air exchange, clear to auscultation bilaterally, no crackles or wheezing     Cardiovascular:   Displaced apical impulse, regular rate and rhythm, normal S1 and S2 with S3 gallop.      Abdomen:   No scars, normal bowel sounds, soft, non-distended, non-tender, no masses palpated, no hepatosplenomegally     Musculoskeletal:   1+ edema in feet. There is no redness, warmth, or swelling of the joints.  Full range of motion noted.  Tone is normal.     Intake/Output Summary (Last 24 hours) at 10/05/17 1230  Last data filed at 10/05/17 1010   Gross per 24 hour   Intake              210 ml   Output             3880 ml   Net            -3670 ml           Data:   ROUTINE LABS (Last four results)  CMP    Recent Labs  Lab 10/06/17  0713 10/05/17  1841 10/05/17  0703 10/04/17  1803     --  139 141   POTASSIUM 3.4 3.6 3.3* 3.6   CHLORIDE 106  --  108 108   CO2 25  --  25 26   ANIONGAP 7  --  6 7   *  --  155* 131*   BUN 19  --  16 14   CR 1.36*  --  1.38* 1.41*   GFRESTIMATED 50*  --  50* 48*   GFRESTBLACK 61  --  60* 59*   NELSY 10.4*  --  10.2* 10.0   MAG 2.0  --  1.8  --    PROTTOTAL  --   --   --  6.8   ALBUMIN  --   --   --  3.2*   BILITOTAL  --   --   --  0.5   ALKPHOS  --   --   --  87    AST  --   --   --  8   ALT  --   --   --  25     CBC    Recent Labs  Lab 10/05/17  0703 10/04/17  1803   WBC 5.6 5.7   RBC 4.86 4.70   HGB 13.9 13.4   HCT 40.0 40.0   MCV 82 85   MCH 28.6 28.5   MCHC 34.8 33.5   RDW 14.2 14.1    199     INRNo lab results found in last 7 days.  CRPNo lab results found in last 7 days.    Blood culture:  Invalid input(s): BC   Urine culture:  No results for input(s): URC in the last 168 hours.  All cultures:  No results for input(s): CULT in the last 168 hours.      Medications:     Current Facility-Administered Medications   Medication     potassium chloride SA (K-DUR/KLOR-CON M) CR tablet 20-40 mEq     potassium chloride (KLOR-CON) Packet 20-40 mEq     potassium chloride 10 mEq in 100 mL intermittent infusion     potassium chloride 10 mEq in 100 mL intermittent infusion with 10 mg lidocaine     potassium chloride 20 mEq in 100 mL NON-STANDARD CONC intermittent infusion     losartan (COZAAR) tablet 100 mg     amLODIPine (NORVASC) tablet 5 mg     hydrALAZINE (APRESOLINE) injection 10 mg     naloxone (NARCAN) injection 0.1-0.4 mg     acetaminophen (TYLENOL) tablet 650 mg     senna-docusate (SENOKOT-S;PERICOLACE) 8.6-50 MG per tablet 1-2 tablet     ondansetron (ZOFRAN-ODT) ODT tab 4 mg    Or     ondansetron (ZOFRAN) injection 4 mg     glucose 40 % gel 15-30 g    Or     dextrose 50 % injection 25-50 mL    Or     glucagon injection 1 mg     insulin aspart (NovoLOG) inj (RAPID ACTING)     insulin aspart (NovoLOG) inj (RAPID ACTING)        Laly Montenegro MS4  [facility]  Pager Contact: see Physician sticky note    The medical student acted as scribe and the encounter documented above was completely performed by myself and the documentation reflects the work I have performed today. Simeon Howard MD

## 2017-10-06 NOTE — PROGRESS NOTES
Pt to have renal u/s at approximately 1700-Per u/s tech pt needs to be NPO for 8 hours. Pt and family updated on NPO status.

## 2017-10-07 VITALS
SYSTOLIC BLOOD PRESSURE: 163 MMHG | TEMPERATURE: 97.2 F | WEIGHT: 204 LBS | RESPIRATION RATE: 16 BRPM | HEART RATE: 91 BPM | OXYGEN SATURATION: 94 % | DIASTOLIC BLOOD PRESSURE: 81 MMHG | HEIGHT: 75 IN | BODY MASS INDEX: 25.36 KG/M2

## 2017-10-07 LAB
ANION GAP SERPL CALCULATED.3IONS-SCNC: 8 MMOL/L (ref 3–14)
BUN SERPL-MCNC: 26 MG/DL (ref 7–30)
CALCIUM SERPL-MCNC: 10.7 MG/DL (ref 8.5–10.1)
CHLORIDE SERPL-SCNC: 106 MMOL/L (ref 94–109)
CO2 SERPL-SCNC: 24 MMOL/L (ref 20–32)
CREAT SERPL-MCNC: 1.46 MG/DL (ref 0.66–1.25)
GFR SERPL CREATININE-BSD FRML MDRD: 46 ML/MIN/1.7M2
GLUCOSE BLDC GLUCOMTR-MCNC: 110 MG/DL (ref 70–99)
GLUCOSE BLDC GLUCOMTR-MCNC: 123 MG/DL (ref 70–99)
GLUCOSE BLDC GLUCOMTR-MCNC: 146 MG/DL (ref 70–99)
GLUCOSE BLDC GLUCOMTR-MCNC: 90 MG/DL (ref 70–99)
GLUCOSE SERPL-MCNC: 124 MG/DL (ref 70–99)
POTASSIUM SERPL-SCNC: 3.4 MMOL/L (ref 3.4–5.3)
SODIUM SERPL-SCNC: 138 MMOL/L (ref 133–144)

## 2017-10-07 PROCEDURE — 00000146 ZZHCL STATISTIC GLUCOSE BY METER IP

## 2017-10-07 PROCEDURE — 80048 BASIC METABOLIC PNL TOTAL CA: CPT | Performed by: FAMILY MEDICINE

## 2017-10-07 PROCEDURE — 25000132 ZZH RX MED GY IP 250 OP 250 PS 637: Performed by: FAMILY MEDICINE

## 2017-10-07 PROCEDURE — 25000132 ZZH RX MED GY IP 250 OP 250 PS 637: Performed by: INTERNAL MEDICINE

## 2017-10-07 PROCEDURE — 36415 COLL VENOUS BLD VENIPUNCTURE: CPT | Performed by: FAMILY MEDICINE

## 2017-10-07 RX ORDER — FUROSEMIDE 20 MG
20 TABLET ORAL DAILY
Qty: 60 TABLET | Refills: 0 | Status: SHIPPED | OUTPATIENT
Start: 2017-10-07 | End: 2017-10-07

## 2017-10-07 RX ORDER — LOSARTAN POTASSIUM 100 MG/1
100 TABLET ORAL DAILY
Qty: 30 TABLET | Refills: 0 | Status: SHIPPED | OUTPATIENT
Start: 2017-10-07

## 2017-10-07 RX ORDER — FUROSEMIDE 20 MG
20 TABLET ORAL DAILY
Status: DISCONTINUED | OUTPATIENT
Start: 2017-10-07 | End: 2017-10-07

## 2017-10-07 RX ORDER — AMLODIPINE BESYLATE 10 MG/1
10 TABLET ORAL DAILY
Status: DISCONTINUED | OUTPATIENT
Start: 2017-10-07 | End: 2017-10-07 | Stop reason: HOSPADM

## 2017-10-07 RX ORDER — FUROSEMIDE 20 MG
40 TABLET ORAL DAILY
Qty: 60 TABLET | Refills: 0 | Status: SHIPPED | OUTPATIENT
Start: 2017-10-07

## 2017-10-07 RX ORDER — FUROSEMIDE 40 MG
40 TABLET ORAL DAILY
Status: DISCONTINUED | OUTPATIENT
Start: 2017-10-07 | End: 2017-10-07 | Stop reason: HOSPADM

## 2017-10-07 RX ORDER — AMLODIPINE BESYLATE 10 MG/1
10 TABLET ORAL DAILY
Qty: 30 TABLET | Refills: 0 | Status: SHIPPED | OUTPATIENT
Start: 2017-10-07

## 2017-10-07 RX ADMIN — LOSARTAN POTASSIUM 100 MG: 50 TABLET, FILM COATED ORAL at 09:19

## 2017-10-07 RX ADMIN — AMLODIPINE BESYLATE 10 MG: 10 TABLET ORAL at 09:19

## 2017-10-07 NOTE — DISCHARGE INSTRUCTIONS
Please call for primary care provider follow up when you return to [place]    Please follow up with a doctor within the next 3 days.     Please get a repeat BMP (kidney function test with blood salt levels) at your next doctor appointment.

## 2017-10-07 NOTE — PROGRESS NOTES
AOx4.  Increased BP- all other VSS.  Pt waived - son interprets for him and was at bedside over night.  Son declined PRN hydralazine for BP of 162/82- will recheck in AM and administer if BP increases.  B, 90, 123- untreated per protocol.  Son educated on s/s of hyperglycemia and will notify RN if any s/s occur.  Pt steady on feet and up ad joe- ambulated to bathroom.  No BM overnight, BS present.  Pt able to make needs known.  Pt slept between cares and assessments.  Continue to monitor BP closely and with POC.

## 2017-10-07 NOTE — PROGRESS NOTES
Patient dc to home with son. Patient son is primary caregiver. Patient and son understand the dc plans and medications. Son keeps a track of blood sugars and blood pressure. P: dc to home

## 2017-10-07 NOTE — PROGRESS NOTES
Pt arrived on unit from 6D at 1910- accompanied by wife and son.  Pt waived , son interprets for him.  Wife and son at bedside.  Pt has shoes from home in room.  Steady on feet and AOx4.  PIV saline locked.

## 2017-10-07 NOTE — PROGRESS NOTES
Pt family refusing Hydralazine for BP: 162/82.  Will recheck BP w/ AM vitals and reassess whether med is needed.

## 2017-10-09 ENCOUNTER — CARE COORDINATION (OUTPATIENT)
Dept: CARE COORDINATION | Facility: CLINIC | Age: 82
End: 2017-10-09

## 2017-10-09 NOTE — PROGRESS NOTES
Patient was discharged from [facility] on 10/7                [facility]  Discharge Summary     Jami Morales MRN# [id number]   Age: 82 year old YOB: 1935      Date of Admission:                                      10/4/2017  Date of Discharge:                                      10/7/2017  Admitting Physician:                                   Cale Craig MD  Discharge Physician:                                  Wilfred Kohli MD  Contact: [phone]  Discharging Service:                                   [facility]

## 2019-02-15 NOTE — PROGRESS NOTES
"BP (!) 168/96  Pulse 98  Temp 98.6  F (37  C) (Oral)  Resp 16  Ht 1.905 m (6' 3\")  Wt 92.8 kg (204 lb 8 oz)  SpO2 97%  BMI 25.56 kg/m2    Amlodipine given per MD orders. Patient denies pain or SOB. Patient up ambulating in halls. Patient voiding, tolerating po intake well. Patient declined further  services. Document signed with  present. Family at bedside.   " Abdominal Pain, N/V/D